# Patient Record
Sex: MALE | Race: BLACK OR AFRICAN AMERICAN | NOT HISPANIC OR LATINO | URBAN - NONMETROPOLITAN AREA
[De-identification: names, ages, dates, MRNs, and addresses within clinical notes are randomized per-mention and may not be internally consistent; named-entity substitution may affect disease eponyms.]

---

## 2018-12-18 PROBLEM — H52.4: Noted: 2019-10-22

## 2018-12-18 PROBLEM — H35.3132: Noted: 2019-01-14

## 2018-12-18 PROBLEM — H40.1134: Noted: 2018-12-18

## 2018-12-18 PROBLEM — H43.393: Noted: 2019-10-18

## 2018-12-18 PROBLEM — H52.222: Noted: 2019-10-22

## 2018-12-18 PROBLEM — H04.123: Noted: 2019-01-14

## 2018-12-18 PROBLEM — Z96.1: Noted: 2019-10-18

## 2018-12-18 PROBLEM — H25.811: Noted: 2019-10-18

## 2019-01-14 ENCOUNTER — IMPORTED ENCOUNTER (OUTPATIENT)
Dept: URBAN - NONMETROPOLITAN AREA CLINIC 1 | Facility: CLINIC | Age: 64
End: 2019-01-14

## 2019-01-14 PROCEDURE — 99213 OFFICE O/P EST LOW 20 MIN: CPT

## 2019-01-14 NOTE — PATIENT DISCUSSION
POAG-  Discussed diagnosis in detail with patient-  IOP better today 03 OD and 09 OS -  Cup to Disc: 0.65 OD and 0.8 OS -  pressures much better today with better compliance reported OU stressed the importance of continuing drop compliance-  continue Rhopressa QHS OU Rx sent 1/22/2019 EDIT -  d/c Rhopressa d/t denied insurance coverage start Lumigan QHS OU-  RTC 3 month f/u POAG w/ PACH and OCT disc; 's Notes: MRDFEOCT [facility] Photos

## 2019-04-23 ENCOUNTER — IMPORTED ENCOUNTER (OUTPATIENT)
Dept: URBAN - NONMETROPOLITAN AREA CLINIC 1 | Facility: CLINIC | Age: 64
End: 2019-04-23

## 2019-04-23 PROCEDURE — 92133 CPTRZD OPH DX IMG PST SGM ON: CPT

## 2019-04-23 PROCEDURE — 76514 ECHO EXAM OF EYE THICKNESS: CPT

## 2019-04-23 PROCEDURE — 99213 OFFICE O/P EST LOW 20 MIN: CPT

## 2019-04-23 NOTE — PATIENT DISCUSSION
POAG-  Discussed diagnosis in detail with patient-  IOP better today 14 OD and 08 OS -  Cup to Disc: 0.65 OD and 0.8 OS -  PACH done today:   .-  OCT done today:  OD shows severe inferior thinning all other quadrants normal. OS shows good RNFL all quadrants normal. -  Again stresse importance of compliance and risk of going blind if left untreated for long periods of elsie-  Continue Rhopressa QHS OU Rx sent today-  will need to update VF and Gonio in near future-  RTC 3 month f/u POAG ARMD -  Discussed findings w/ pt today-  Signs/symptoms associated discussed-  Macular scar noted OD and drusen noted OS-  Recommended AG use call or come in ASAP if changes in vision are noted from today. -  Continue eye vitamins daily-  Will need to obtain OCT of macula in near future-  Continue to monitor closelyCombined Cataracts OD-  discussed findings w/ pt today-  will continue to monitor for nowPseuodphakia OS-  discussed findings w/ pt today-  stable IOL continue to monitor yearly or PRN PVD-  Discussed findings of exam in detail with the patient. -  The risk of retinal detachment in patients with PVDs was discussed with the patient and the warning signs of retinal detachment were carefully reviewed with the patient. -  The patient was warned to return to the office or contact the ophthalmologist on call immediately if they experience signs of retinal detachment or changes in vision noted from today.  -  Continue to monitor PRN; 's Notes: MRDFEOCT MacOCTdisc 4/2/19VFGonioPACH 4/23/19 ([id number]) - [facility] [employer]

## 2019-07-23 ENCOUNTER — IMPORTED ENCOUNTER (OUTPATIENT)
Dept: URBAN - NONMETROPOLITAN AREA CLINIC 1 | Facility: CLINIC | Age: 64
End: 2019-07-23

## 2019-07-23 PROCEDURE — 99213 OFFICE O/P EST LOW 20 MIN: CPT

## 2019-07-23 NOTE — PATIENT DISCUSSION
POAG OU-  Discussed diagnosis in detail with patient-  IOPs stable at 05 OD 05 OS-  Cup to Disc: 0.65 OD and 0.8 OS -  pressures much better today with better compliance reported OU stressed the importance of continuing drop compliance-  continue Lumigan Q OU-  RTC 3 month Complete (needs Refraction) w/Gonio; Dr's Notes: MRDFEOCT [employer]

## 2019-10-22 ENCOUNTER — IMPORTED ENCOUNTER (OUTPATIENT)
Dept: URBAN - NONMETROPOLITAN AREA CLINIC 1 | Facility: CLINIC | Age: 64
End: 2019-10-22

## 2019-10-22 PROCEDURE — S0621 ROUTINE OPHTHALMOLOGICAL EXA: HCPCS

## 2019-10-22 PROCEDURE — 92020 GONIOSCOPY: CPT

## 2019-10-22 NOTE — PATIENT DISCUSSION
POAG OU-  Discussed diagnosis in detail with patient-  IOPs slightly higher at 10 OD 12 OS-  Cup to Disc: 0.7 OD and 0.8 OS -  Gonio done today.   OD: Grade 3 heavy pigment; OS: Grade 4 heavy pigment OS-  patient has history of non-compliance w/gtts-  reports good compliance at this time-  continue Lumigan QHS OU-  RTC 3 mo f/u ARMD OU-  discussed findings w/patient-  large macular scar OD-  RPE changes OS-  needs to have OCT Mac done at next visit-  RTC 3 mo or prn w/OCT MacSimple Astigmatism OS w/Presbyopia-  discussed findings w/patient-  new spectacle Rx issued-  continue to monitor yearly or prn; 's Notes: MR 10/22/2019DFE 10/22/2019[place] 10/22/2019OCT ONHOCT [facility] [id number] VFPach

## 2020-01-21 ENCOUNTER — IMPORTED ENCOUNTER (OUTPATIENT)
Dept: URBAN - NONMETROPOLITAN AREA CLINIC 1 | Facility: CLINIC | Age: 65
End: 2020-01-21

## 2020-01-21 PROCEDURE — 92134 CPTRZ OPH DX IMG PST SGM RTA: CPT

## 2020-01-21 PROCEDURE — 99213 OFFICE O/P EST LOW 20 MIN: CPT

## 2020-01-21 NOTE — PATIENT DISCUSSION
POAG OU-  Discussed diagnosis in detail with patient-  Unable to check IOP today-  Cup to Disc: 0.7 OD and 0.8 OS -  Gonio done 10/22/2019. OD: Grade 3 heavy pigment; OS: Grade 4 heavy pigment -  patient has history of non-compliance w/gtts-  reports good compliance at this time-  continue Lumigan QHS OU refills sent-  RTC 3 mo f/u POAG w/ OCT ONH ARMD OU-  discussed findings w/patient-  large macular scar OD-  RPE changes OS-  OCT Mac done today 1/21/2020: OD&OS stable with clinical findings -  RTC 3 mo or prnNarrow Angle Anatomy s/p YAG PI OU-  Explained narrow anatomy and what it can mean if it is left untreated. -  Reviewed the signs and symptoms of a narrow angle attack-  Patent PI's OU-  continue to monitor as scheduled or prn; 's Notes: MR 10/22/2019DFE 10/22/2019Gonio 10/22/2019OCT ONHOCT Mac 1/21/2020Fundus [id number] VFPach

## 2021-07-13 NOTE — PATIENT DISCUSSION
Sent  text to Dr. Melida Mortimer team to see if he perfers to do the cataract surgery, or get IOL power calculations.

## 2021-07-13 NOTE — PATIENT DISCUSSION
Will speak with WJL to see if he will do cataract surgery OD due to corneal implant, or give IOL power calculation.

## 2021-08-11 NOTE — PATIENT DISCUSSION
Sent  text to Dr. Taisha Viera team to see if he perfers to do the cataract surgery, or get IOL power calculations.

## 2021-08-11 NOTE — PATIENT DISCUSSION
Sent  text to Dr. Chai Juarez team to see if he perfers to do the cataract surgery, or get IOL power calculations.

## 2021-08-12 NOTE — PATIENT DISCUSSION
Sent  text to Dr. Evangelina Montoya team to see if he perfers to do the cataract surgery, or get IOL power calculations.

## 2021-08-18 NOTE — PATIENT DISCUSSION
Sent  text to Dr. Osman Guest team to see if he perfers to do the cataract surgery, or get IOL power calculations.

## 2021-08-18 NOTE — PATIENT DISCUSSION
Sent  text to Dr. Sri Carpio team to see if he perfers to do the cataract surgery, or get IOL power calculations.

## 2021-08-18 NOTE — PATIENT DISCUSSION
Cataract surgery has been performed in the first eye and activities of daily living are still impaired. The patient would like to proceed with cataract surgery in the second eye as scheduled. The patient elects Basic IOL OD, mark.

## 2022-04-09 ASSESSMENT — PACHYMETRY
OD_CT_UM: 496; ADJ: VTHIN
OS_CT_UM: 472; ADJ: VTHIN
OS_CT_UM: 472; ADJ: VTHIN
OD_CT_UM: 496; ADJ: VTHIN
OS_CT_UM: 472; ADJ: VTHIN
OD_CT_UM: 496; ADJ: VTHIN

## 2022-04-09 ASSESSMENT — TONOMETRY
OD_IOP_MMHG: 09
OS_IOP_MMHG: 12
OD_IOP_MMHG: 14
OS_IOP_MMHG: 03
OS_IOP_MMHG: 05
OS_IOP_MMHG: 08
OD_IOP_MMHG: 05
OD_IOP_MMHG: 10

## 2022-04-09 ASSESSMENT — VISUAL ACUITY
OD_SC: 20/CF
OS_SC: 20/20
OS_SC: 20/25-1
OD_CC: CF4'
OD_SC: CF2'
OS_SC: 20/25-1
OU_SC: 20/25-1
